# Patient Record
Sex: MALE | Race: OTHER | HISPANIC OR LATINO | URBAN - METROPOLITAN AREA
[De-identification: names, ages, dates, MRNs, and addresses within clinical notes are randomized per-mention and may not be internally consistent; named-entity substitution may affect disease eponyms.]

---

## 2020-04-03 ENCOUNTER — EMERGENCY (EMERGENCY)
Facility: HOSPITAL | Age: 45
LOS: 1 days | Discharge: HOME | End: 2020-04-03
Attending: EMERGENCY MEDICINE
Payer: SELF-PAY

## 2020-04-03 VITALS
DIASTOLIC BLOOD PRESSURE: 89 MMHG | HEART RATE: 104 BPM | RESPIRATION RATE: 20 BRPM | SYSTOLIC BLOOD PRESSURE: 131 MMHG | HEIGHT: 72 IN | TEMPERATURE: 98 F | WEIGHT: 210.1 LBS | OXYGEN SATURATION: 97 %

## 2020-04-03 PROCEDURE — 99284 EMERGENCY DEPT VISIT MOD MDM: CPT

## 2020-04-03 PROCEDURE — 99053 MED SERV 10PM-8AM 24 HR FAC: CPT

## 2020-04-03 PROCEDURE — T1013: CPT

## 2020-04-03 PROCEDURE — 99282 EMERGENCY DEPT VISIT SF MDM: CPT

## 2020-04-03 NOTE — ED PROVIDER NOTE - CLINICAL SUMMARY MEDICAL DECISION MAKING FREE TEXT BOX
45y M w/ DM, presenting for 2 days of flu-like symptoms.  Pt appears well, in no distress, stable VS, O2 sat 97% on RA.  Pt counseled to continue taking Tylenol for symptoms, and to stay at home for 2 weeks.  Strict return precautions given for shortness of breath or other worsening symptoms.

## 2020-04-03 NOTE — ED PROVIDER NOTE - NS ED ROS FT
Constitutional: +fever  Eyes: no vision changes  ENT: no nasal congestion, no sore throat  CV: no chest pain  Resp: +cough, no shortness of breath  GI: no abdominal pain, no vomiting, +diarrhea  : no dysuria  MSK: +body aches  Skin: no rash  Neuro: no headache, no weakness, no paresthesias

## 2020-04-03 NOTE — ED PROVIDER NOTE - ATTENDING CONTRIBUTION TO CARE
Kali: I performed a face to face bedside interview with patient regarding history of present illness, review of symptoms and past medical history. I completed an independent physical exam.  I have discussed patient's plan of care with resident.   I agree with note as stated above including HISTORY OF PRESENT ILLNESS, HIV, PAST MEDICAL/SURGICAL/FAMILY/SOCIAL HISTORY, ALLERGIES AND HOME MEDICATIONS, REVIEW OF SYSTEMS, PHYSICAL EXAM, MEDICAL DECISION MAKING and any PROGRESS NOTES during the time I functioned as the attending physician for this patient unless otherwise noted. My brief assessment is as follows: 45M p/w myalgias, fever, cough x 2 days. +sick contacts. LUNGS CTAB, satting 97% on RA. Likely COVID. Patient well appearing.  Does not meet current COVID-19 testing or admission criteria. Advised home quarantine  for 14-days.  Anticipatory guidance provided and supportive care recommended. Advised immediate return if worsening symptoms, especially if short of breath.

## 2020-04-03 NOTE — ED PROVIDER NOTE - PATIENT PORTAL LINK FT
You can access the FollowMyHealth Patient Portal offered by [facility] by registering at the following website: [email or web address]. By joining [facility]’s FollowMyHealth portal, you will also be able to view your health information using other applications (apps) compatible with our system.

## 2020-04-03 NOTE — ED PROVIDER NOTE - NSFOLLOWUPINSTRUCTIONS_ED_ALL_ED_FT
Chiawuli Tak Tylenol según sea necesario para los síntomas. Quédese en casa giuliana 2 semanas, hasta que kimberly síntomas se hayan resuelto por completo. Mantente hidratado. Regrese a la bita de emergencias para empeorar los síntomas, que incluyen angelica mayor dificultad para respirar o dolor en el pecho. - Follow up with your doctor over the phone within 2-3 days.   - Take Tylenol (Acetaminophen) 650mg or Motrin (Ibuprofen/Advil) 600mg every 6 hours as needed for fever or pain.   - Stay well hydrated.   - Stay at home for 14 days.    - See attached COVID-19 instructions.   - Return to the ED for any new or worsening symptoms.     Viral Respiratory Infection    A viral respiratory infection is an illness that affects parts of the body used for breathing, like the lungs, nose, and throat. It is caused by a germ called a virus. Symptoms can include runny nose, coughing, sneezing, fatigue, body aches, sore throat, fever, or headache. Over the counter medicine can be used to manage the symptoms but the infection typically goes away on its own in 5 to 10 days.     SEEK IMMEDIATE MEDICAL CARE IF YOU HAVE ANY OF THE FOLLOWING SYMPTOMS: shortness of breath, chest pain, fever over 10 days, or lightheadedness/dizziness.

## 2020-04-03 NOTE — ED PROVIDER NOTE - OBJECTIVE STATEMENT
45y M w/ DM, presenting for flu-like symptoms beginning 2 days ago, with fever, cough, body aches, diarrhea.  Denies SOB, chest pain.  Pt has been taking tylenol for symptoms, last dose yesterday.  Pt's wife has also been sick with similar symptoms, and he states that 2 of his coworkers have also been sick.  Works as a [employer].  Smokes ~1 cigarette/week.